# Patient Record
Sex: FEMALE | Race: BLACK OR AFRICAN AMERICAN | NOT HISPANIC OR LATINO | Employment: FULL TIME | URBAN - METROPOLITAN AREA
[De-identification: names, ages, dates, MRNs, and addresses within clinical notes are randomized per-mention and may not be internally consistent; named-entity substitution may affect disease eponyms.]

---

## 2017-07-25 PROCEDURE — 81025 URINE PREGNANCY TEST: CPT | Performed by: EMERGENCY MEDICINE

## 2017-07-25 PROCEDURE — 96361 HYDRATE IV INFUSION ADD-ON: CPT

## 2017-07-25 PROCEDURE — 81000 URINALYSIS NONAUTO W/SCOPE: CPT

## 2017-07-25 PROCEDURE — 96375 TX/PRO/DX INJ NEW DRUG ADDON: CPT

## 2017-07-25 PROCEDURE — 96365 THER/PROPH/DIAG IV INF INIT: CPT

## 2017-07-25 PROCEDURE — 99284 EMERGENCY DEPT VISIT MOD MDM: CPT | Mod: 25

## 2017-07-25 PROCEDURE — 87591 N.GONORRHOEAE DNA AMP PROB: CPT

## 2017-07-25 PROCEDURE — 96372 THER/PROPH/DIAG INJ SC/IM: CPT

## 2017-07-26 ENCOUNTER — HOSPITAL ENCOUNTER (EMERGENCY)
Facility: HOSPITAL | Age: 42
Discharge: HOME | End: 2017-07-26
Attending: EMERGENCY MEDICINE
Payer: MEDICAID

## 2017-07-26 VITALS
RESPIRATION RATE: 18 BRPM | HEIGHT: 59 IN | WEIGHT: 143 LBS | SYSTOLIC BLOOD PRESSURE: 150 MMHG | HEART RATE: 70 BPM | BODY MASS INDEX: 28.83 KG/M2 | TEMPERATURE: 99 F | OXYGEN SATURATION: 100 % | DIASTOLIC BLOOD PRESSURE: 78 MMHG

## 2017-07-26 DIAGNOSIS — N13.30 HYDRONEPHROSIS, RIGHT: Primary | ICD-10-CM

## 2017-07-26 DIAGNOSIS — N20.0 NEPHROLITHIASIS: ICD-10-CM

## 2017-07-26 LAB
ALBUMIN SERPL BCP-MCNC: 3.5 G/DL
ALP SERPL-CCNC: 77 U/L
ALT SERPL W/O P-5'-P-CCNC: 9 U/L
ANION GAP SERPL CALC-SCNC: 9 MMOL/L
AST SERPL-CCNC: 21 U/L
B-HCG UR QL: NEGATIVE
BACTERIA #/AREA URNS HPF: ABNORMAL /HPF
BASOPHILS # BLD AUTO: 0.01 K/UL
BASOPHILS NFR BLD: 0.1 %
BILIRUB SERPL-MCNC: 0.6 MG/DL
BILIRUB UR QL STRIP: NEGATIVE
BUN SERPL-MCNC: 15 MG/DL
C TRACH DNA SPEC QL NAA+PROBE: NOT DETECTED
CALCIUM SERPL-MCNC: 9.9 MG/DL
CHLORIDE SERPL-SCNC: 105 MMOL/L
CLARITY UR: CLEAR
CO2 SERPL-SCNC: 24 MMOL/L
COLOR UR: YELLOW
CREAT SERPL-MCNC: 1.2 MG/DL
CTP QC/QA: YES
DIFFERENTIAL METHOD: ABNORMAL
EOSINOPHIL # BLD AUTO: 0 K/UL
EOSINOPHIL NFR BLD: 0.1 %
ERYTHROCYTE [DISTWIDTH] IN BLOOD BY AUTOMATED COUNT: 14.5 %
EST. GFR  (AFRICAN AMERICAN): >60 ML/MIN/1.73 M^2
EST. GFR  (NON AFRICAN AMERICAN): 56 ML/MIN/1.73 M^2
GLUCOSE SERPL-MCNC: 157 MG/DL
GLUCOSE UR QL STRIP: NEGATIVE
HCT VFR BLD AUTO: 34.9 %
HGB BLD-MCNC: 11.4 G/DL
HGB UR QL STRIP: NEGATIVE
KETONES UR QL STRIP: ABNORMAL
LEUKOCYTE ESTERASE UR QL STRIP: NEGATIVE
LYMPHOCYTES # BLD AUTO: 2.2 K/UL
LYMPHOCYTES NFR BLD: 18 %
MCH RBC QN AUTO: 29.5 PG
MCHC RBC AUTO-ENTMCNC: 32.7 G/DL
MCV RBC AUTO: 90 FL
MICROSCOPIC COMMENT: ABNORMAL
MONOCYTES # BLD AUTO: 0.9 K/UL
MONOCYTES NFR BLD: 7.5 %
N GONORRHOEA DNA SPEC QL NAA+PROBE: NOT DETECTED
NEUTROPHILS # BLD AUTO: 8.9 K/UL
NEUTROPHILS NFR BLD: 74.2 %
NITRITE UR QL STRIP: NEGATIVE
PH UR STRIP: 6 [PH] (ref 5–8)
PLATELET # BLD AUTO: 380 K/UL
PMV BLD AUTO: 9.6 FL
POTASSIUM SERPL-SCNC: 4 MMOL/L
PROT SERPL-MCNC: 8.6 G/DL
PROT UR QL STRIP: NEGATIVE
RBC # BLD AUTO: 3.87 M/UL
RBC #/AREA URNS HPF: 1 /HPF (ref 0–4)
SODIUM SERPL-SCNC: 138 MMOL/L
SP GR UR STRIP: 1.02 (ref 1–1.03)
SQUAMOUS #/AREA URNS HPF: ABNORMAL /HPF
TRICHOMONAS UR QL MICRO: ABNORMAL
URN SPEC COLLECT METH UR: ABNORMAL
UROBILINOGEN UR STRIP-ACNC: NEGATIVE EU/DL
WBC # BLD AUTO: 12.03 K/UL
WBC #/AREA URNS HPF: 4 /HPF (ref 0–5)

## 2017-07-26 PROCEDURE — 80053 COMPREHEN METABOLIC PANEL: CPT

## 2017-07-26 PROCEDURE — 85025 COMPLETE CBC W/AUTO DIFF WBC: CPT

## 2017-07-26 PROCEDURE — 25500020 PHARM REV CODE 255: Performed by: EMERGENCY MEDICINE

## 2017-07-26 PROCEDURE — 25000003 PHARM REV CODE 250: Performed by: NURSE PRACTITIONER

## 2017-07-26 PROCEDURE — 63600175 PHARM REV CODE 636 W HCPCS: Performed by: NURSE PRACTITIONER

## 2017-07-26 RX ORDER — KETOROLAC TROMETHAMINE 30 MG/ML
30 INJECTION, SOLUTION INTRAMUSCULAR; INTRAVENOUS
Status: COMPLETED | OUTPATIENT
Start: 2017-07-26 | End: 2017-07-26

## 2017-07-26 RX ORDER — AZITHROMYCIN 250 MG/1
1000 TABLET, FILM COATED ORAL
Status: COMPLETED | OUTPATIENT
Start: 2017-07-26 | End: 2017-07-26

## 2017-07-26 RX ORDER — CEFTRIAXONE 1 G/1
250 INJECTION, POWDER, FOR SOLUTION INTRAMUSCULAR; INTRAVENOUS
Status: COMPLETED | OUTPATIENT
Start: 2017-07-26 | End: 2017-07-26

## 2017-07-26 RX ORDER — NAPROXEN 500 MG/1
500 TABLET ORAL 2 TIMES DAILY PRN
Qty: 30 TABLET | Refills: 0 | OUTPATIENT
Start: 2017-07-26 | End: 2018-08-21

## 2017-07-26 RX ORDER — METRONIDAZOLE 500 MG/1
2 TABLET ORAL
Status: COMPLETED | OUTPATIENT
Start: 2017-07-26 | End: 2017-07-26

## 2017-07-26 RX ORDER — ONDANSETRON 2 MG/ML
8 INJECTION INTRAMUSCULAR; INTRAVENOUS
Status: COMPLETED | OUTPATIENT
Start: 2017-07-26 | End: 2017-07-26

## 2017-07-26 RX ADMIN — IOHEXOL 70 ML: 350 INJECTION, SOLUTION INTRAVENOUS at 03:07

## 2017-07-26 RX ADMIN — KETOROLAC TROMETHAMINE 30 MG: 30 INJECTION, SOLUTION INTRAMUSCULAR at 05:07

## 2017-07-26 RX ADMIN — METRONIDAZOLE 2 G: 500 TABLET ORAL at 05:07

## 2017-07-26 RX ADMIN — PROMETHAZINE HYDROCHLORIDE 12.5 MG: 25 INJECTION INTRAMUSCULAR; INTRAVENOUS at 03:07

## 2017-07-26 RX ADMIN — CEFTRIAXONE 250 MG: 1 INJECTION, POWDER, FOR SOLUTION INTRAMUSCULAR; INTRAVENOUS at 05:07

## 2017-07-26 RX ADMIN — ONDANSETRON 8 MG: 2 INJECTION INTRAMUSCULAR; INTRAVENOUS at 02:07

## 2017-07-26 RX ADMIN — AZITHROMYCIN 1000 MG: 250 TABLET, FILM COATED ORAL at 05:07

## 2017-07-26 RX ADMIN — SODIUM CHLORIDE 1000 ML: 0.9 INJECTION, SOLUTION INTRAVENOUS at 02:07

## 2017-07-26 NOTE — ED TRIAGE NOTES
Pt c/o vomiting and abdominal pain.on right side.  She states that she is having back pain on the right side.   She states that she has been having chills for a fever.    She states that she has urinating often and feels to go back to urinate again.  Pt states that she has taken ibuprofen and Pamprin to help with the pain with no relief

## 2017-07-26 NOTE — ED PROVIDER NOTES
"Encounter Date: 2017    SCRIBE #1 NOTE: I, Jaky Vicente, am scribing for, and in the presence of,  Rohan Michaud NP. I have scribed the following portions of the note - Other sections scribed: HPI, ROS.       History     Chief Complaint   Patient presents with    Abdominal Pain     "I've been throwing up since 3, and my back and my stomach have pains."     CC: Flank Pain; Abdominal Pain    HPI: 41 year old female presents to the ED c/o acute onset, severe (9/10) R flank pain that radiates down to the RLQ with associated nausea, NBNB vomiting, urinary frequency, and subjective fever since 3:00 pm yesterday. Pt otherwise denies diarrhea and dysuria. Prior attempted treatment with ibuprofen, but pt was unable to keep the medication down. No alleviating factors.      The history is provided by the patient. No  was used.     Review of patient's allergies indicates:  No Known Allergies  History reviewed. No pertinent past medical history.  Past Surgical History:   Procedure Laterality Date     SECTION       History reviewed. No pertinent family history.  Social History   Substance Use Topics    Smoking status: Never Smoker    Smokeless tobacco: Never Used    Alcohol use No     Review of Systems   Constitutional: Positive for fever (subjective). Negative for chills and diaphoresis.   HENT: Negative for ear pain and sore throat.    Eyes: Negative for photophobia and visual disturbance.   Respiratory: Negative for cough and shortness of breath.    Cardiovascular: Negative for chest pain.   Gastrointestinal: Positive for abdominal pain (RLQ), nausea and vomiting. Negative for diarrhea.   Genitourinary: Positive for flank pain (R) and frequency. Negative for dysuria.   Musculoskeletal: Negative for back pain.   Skin: Negative for rash.   Neurological: Negative for headaches.       Physical Exam     Initial Vitals [17 2332]   BP Pulse Resp Temp SpO2   (!) 175/87 87 18 98.6 °F (37 °C) 99 " %      MAP       116.33         Physical Exam    Nursing note and vitals reviewed.  Constitutional: She appears well-developed and well-nourished. She is not diaphoretic. No distress.   HENT:   Head: Normocephalic and atraumatic.   Right Ear: External ear normal.   Left Ear: External ear normal.   Nose: Nose normal.   Eyes: Conjunctivae and EOM are normal. Pupils are equal, round, and reactive to light. Right eye exhibits no discharge. Left eye exhibits no discharge. No scleral icterus.   Neck: Normal range of motion. Neck supple. No thyromegaly present. No tracheal deviation present. No JVD present.   Cardiovascular: Normal rate, regular rhythm, normal heart sounds and intact distal pulses. Exam reveals no gallop and no friction rub.    No murmur heard.  Pulmonary/Chest: Breath sounds normal. No stridor. No respiratory distress. She has no wheezes. She has no rhonchi. She has no rales.   Abdominal: Soft. Bowel sounds are normal. She exhibits no distension and no mass. There is no hepatosplenomegaly. There is tenderness in the right lower quadrant. There is CVA tenderness (right-sided). There is no rebound, no guarding, no tenderness at McBurney's point and negative Vital's sign. No hernia.   Musculoskeletal: Normal range of motion. She exhibits no edema or tenderness.   Lymphadenopathy:     She has no cervical adenopathy.   Neurological: She is alert and oriented to person, place, and time. She has normal strength and normal reflexes. She displays normal reflexes. No cranial nerve deficit or sensory deficit.   Skin: Skin is warm and dry. No rash and no abscess noted. No erythema. No pallor.   Psychiatric: She has a normal mood and affect. Her behavior is normal. Judgment and thought content normal.         ED Course   Procedures  Labs Reviewed   URINALYSIS - Abnormal; Notable for the following:        Result Value    Ketones, UA Trace (*)     All other components within normal limits   CBC W/ AUTO DIFFERENTIAL -  Abnormal; Notable for the following:     RBC 3.87 (*)     Hemoglobin 11.4 (*)     Hematocrit 34.9 (*)     Platelets 380 (*)     Gran # 8.9 (*)     Gran% 74.2 (*)     All other components within normal limits   COMPREHENSIVE METABOLIC PANEL - Abnormal; Notable for the following:     Glucose 157 (*)     Total Protein 8.6 (*)     ALT 9 (*)     eGFR if non  56 (*)     All other components within normal limits   URINALYSIS MICROSCOPIC - Abnormal; Notable for the following:     Trichomonas, UA Few (*)     All other components within normal limits   C. TRACHOMATIS/N. GONORRHOEAE BY AMP DNA   POCT URINE PREGNANCY             Medical Decision Making:   Clinical Tests:   Lab Tests: Ordered and Reviewed  Radiological Study: Ordered and Reviewed  ED Management:  This is a 41-year-old female presenting to the emergency department for evaluation of right lumbar back, right flank, and right lower quadrant abdominal pain with associated nausea and vomiting.Symptoms began yesterday and afternoon a been continuous. Patient is afebrile and appears uncomfortable. She denies diarrhea, dysuria, chest pain, shortness of breath, vaginal bleeding, vaginal discharge. Right CVA tenderness to palpation. Patient states she was recently told by a clinic that she was positive for chlamydia but has not been treated. I will treat for chlamydia. CT of remarkable for right hydroureteronephrosis without evidence of obstructive stone.Finding likely represents difficult to visualize obstructing calculus versus obstructing calculus that has passed. I considered but doubt pyelonephritis, appendicitis, diverticulitis, cholecystitis, among others.    Ordered Rocephin, Flagyl, azithromycin to treat for chlamydia. Prescribed naproxen at discharge. Instructed patient to follow-up with PCP and/or urology. Patient is safe and stable for discharge and outpatient follow-up. Pt discharged home with instructions for follow up and supportive care. ED  return precautions given. Pt verbalized understanding of all information and instructions given.   Other:   I have discussed this case with another health care provider.       <> Summary of the Discussion: Case discussed my attending physician Christiana Fatima M.D. who agreed with the assessment and plan            Scribe Attestation:   Scribe #1: I performed the above scribed service and the documentation accurately describes the services I performed. I attest to the accuracy of the note.    Attending Attestation:           Physician Attestation for Scribe:  Physician Attestation Statement for Scribe #1: I, Rohan Michaud NP, reviewed documentation, as scribed by Jaky Vicente in my presence, and it is both accurate and complete.                 ED Course   Value Comment By Time   Appearance, UA: Clear (Reviewed) Rohan Michaud NP 07/26 4399     Clinical Impression:   The primary encounter diagnosis was Hydronephrosis, right. A diagnosis of Nephrolithiasis was also pertinent to this visit.    Disposition:   Disposition: Discharged  Condition: Stable                        Rohan Michaud NP  07/26/17 5885

## 2017-07-26 NOTE — DISCHARGE INSTRUCTIONS
Follow-up with your primary care physician in the next several days.    Pain medication as needed and only as prescribed.    Return to the emergency department for any new or worsening symptoms.

## 2018-08-06 DIAGNOSIS — Z12.39 SCREENING BREAST EXAMINATION: Primary | ICD-10-CM

## 2018-08-13 ENCOUNTER — HOSPITAL ENCOUNTER (OUTPATIENT)
Dept: RADIOLOGY | Facility: HOSPITAL | Age: 43
Discharge: HOME | End: 2018-08-13
Attending: INTERNAL MEDICINE
Payer: MEDICAID

## 2018-08-13 VITALS — BODY MASS INDEX: 28.83 KG/M2 | WEIGHT: 143 LBS | HEIGHT: 59 IN

## 2018-08-13 DIAGNOSIS — Z12.39 SCREENING BREAST EXAMINATION: ICD-10-CM

## 2018-08-13 PROCEDURE — 77067 SCR MAMMO BI INCL CAD: CPT | Mod: 26,,, | Performed by: RADIOLOGY

## 2018-08-13 PROCEDURE — 77063 BREAST TOMOSYNTHESIS BI: CPT | Mod: TC

## 2018-08-13 PROCEDURE — 77063 BREAST TOMOSYNTHESIS BI: CPT | Mod: 26,,, | Performed by: RADIOLOGY

## 2018-08-21 ENCOUNTER — HOSPITAL ENCOUNTER (EMERGENCY)
Facility: HOSPITAL | Age: 43
Discharge: HOME | End: 2018-08-21
Attending: EMERGENCY MEDICINE
Payer: MEDICAID

## 2018-08-21 VITALS
WEIGHT: 135 LBS | SYSTOLIC BLOOD PRESSURE: 142 MMHG | OXYGEN SATURATION: 98 % | BODY MASS INDEX: 27.21 KG/M2 | HEART RATE: 96 BPM | RESPIRATION RATE: 18 BRPM | DIASTOLIC BLOOD PRESSURE: 100 MMHG | HEIGHT: 59 IN | TEMPERATURE: 100 F

## 2018-08-21 DIAGNOSIS — S16.1XXA CERVICAL STRAIN, ACUTE, INITIAL ENCOUNTER: Primary | ICD-10-CM
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
## 2018-08-21 DIAGNOSIS — R51.9 ACUTE NONINTRACTABLE HEADACHE, UNSPECIFIED HEADACHE TYPE: ICD-10-CM

## 2018-08-21 DIAGNOSIS — R03.0 ELEVATED BLOOD PRESSURE READING WITHOUT DIAGNOSIS OF HYPERTENSION: ICD-10-CM

## 2018-08-21 PROCEDURE — 25000003 PHARM REV CODE 250: Performed by: EMERGENCY MEDICINE

## 2018-08-21 PROCEDURE — 99283 EMERGENCY DEPT VISIT LOW MDM: CPT

## 2018-08-21 RX ORDER — CYCLOBENZAPRINE HCL 10 MG
10 TABLET ORAL 3 TIMES DAILY PRN
Qty: 15 TABLET | Refills: 0 | Status: SHIPPED | OUTPATIENT
Start: 2018-08-21 | End: 2018-08-26

## 2018-08-21 RX ORDER — IBUPROFEN 600 MG/1
600 TABLET ORAL EVERY 6 HOURS PRN
Qty: 20 TABLET | Refills: 0 | Status: SHIPPED | OUTPATIENT
Start: 2018-08-21 | End: 2023-04-30 | Stop reason: HOSPADM

## 2018-08-21 RX ORDER — IBUPROFEN 600 MG/1
600 TABLET ORAL
Status: COMPLETED | OUTPATIENT
Start: 2018-08-21 | End: 2018-08-21

## 2018-08-21 RX ORDER — CYCLOBENZAPRINE HCL 10 MG
10 TABLET ORAL
Status: COMPLETED | OUTPATIENT
Start: 2018-08-21 | End: 2018-08-21

## 2018-08-21 RX ADMIN — IBUPROFEN 600 MG: 600 TABLET ORAL at 04:08

## 2018-08-21 RX ADMIN — CYCLOBENZAPRINE HYDROCHLORIDE 10 MG: 10 TABLET, FILM COATED ORAL at 04:08
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
## 2022-11-29 DIAGNOSIS — Z12.31 ENCOUNTER FOR SCREENING MAMMOGRAM FOR MALIGNANT NEOPLASM OF BREAST: Primary | ICD-10-CM

## 2023-01-10 ENCOUNTER — HOSPITAL ENCOUNTER (OUTPATIENT)
Dept: RADIOLOGY | Facility: HOSPITAL | Age: 48
Discharge: HOME | End: 2023-01-10
Attending: INTERNAL MEDICINE
Payer: MEDICAID

## 2023-01-10 VITALS — HEIGHT: 59 IN | WEIGHT: 135 LBS | BODY MASS INDEX: 27.21 KG/M2

## 2023-01-10 DIAGNOSIS — Z12.31 ENCOUNTER FOR SCREENING MAMMOGRAM FOR MALIGNANT NEOPLASM OF BREAST: ICD-10-CM

## 2023-01-10 PROCEDURE — 77067 SCR MAMMO BI INCL CAD: CPT | Mod: 26,,, | Performed by: RADIOLOGY

## 2023-01-10 PROCEDURE — 77067 SCR MAMMO BI INCL CAD: CPT | Mod: TC

## 2023-01-10 PROCEDURE — 77063 MAMMO DIGITAL SCREENING BILAT WITH TOMO: ICD-10-PCS | Mod: 26,,, | Performed by: RADIOLOGY

## 2023-01-10 PROCEDURE — 77063 BREAST TOMOSYNTHESIS BI: CPT | Mod: 26,,, | Performed by: RADIOLOGY

## 2023-01-10 PROCEDURE — 77067 MAMMO DIGITAL SCREENING BILAT WITH TOMO: ICD-10-PCS | Mod: 26,,, | Performed by: RADIOLOGY

## 2023-04-30 ENCOUNTER — HOSPITAL ENCOUNTER (EMERGENCY)
Facility: HOSPITAL | Age: 48
Discharge: HOME | End: 2023-05-01
Attending: EMERGENCY MEDICINE
Payer: MEDICAID

## 2023-04-30 DIAGNOSIS — R03.0 ELEVATED BLOOD PRESSURE READING: ICD-10-CM

## 2023-04-30 DIAGNOSIS — R51.9 ACUTE NONINTRACTABLE HEADACHE, UNSPECIFIED HEADACHE TYPE: Primary | ICD-10-CM

## 2023-04-30 LAB
ANION GAP SERPL CALC-SCNC: 19 MMOL/L (ref 8–16)
B-HCG UR QL: NEGATIVE
BILIRUB UR QL STRIP: NEGATIVE
BUN SERPL-MCNC: 13 MG/DL (ref 6–30)
CHLORIDE SERPL-SCNC: 103 MMOL/L (ref 95–110)
CLARITY UR: CLEAR
COLOR UR: COLORLESS
CREAT SERPL-MCNC: 0.5 MG/DL (ref 0.5–1.4)
CTP QC/QA: YES
GLUCOSE SERPL-MCNC: 201 MG/DL (ref 70–110)
GLUCOSE UR QL STRIP: ABNORMAL
HCT VFR BLD CALC: 35 %PCV (ref 36–54)
HGB UR QL STRIP: NEGATIVE
KETONES UR QL STRIP: NEGATIVE
LEUKOCYTE ESTERASE UR QL STRIP: NEGATIVE
NITRITE UR QL STRIP: NEGATIVE
PH UR STRIP: 6 [PH] (ref 5–8)
POC IONIZED CALCIUM: 1.24 MMOL/L (ref 1.06–1.42)
POC TCO2 (MEASURED): 20 MMOL/L (ref 23–29)
POCT GLUCOSE: 212 MG/DL (ref 70–110)
POTASSIUM BLD-SCNC: 4.4 MMOL/L (ref 3.5–5.1)
PROT UR QL STRIP: NEGATIVE
SAMPLE: ABNORMAL
SODIUM BLD-SCNC: 137 MMOL/L (ref 136–145)
SP GR UR STRIP: 1 (ref 1–1.03)
URN SPEC COLLECT METH UR: ABNORMAL
UROBILINOGEN UR STRIP-ACNC: NEGATIVE EU/DL

## 2023-04-30 PROCEDURE — 81003 URINALYSIS AUTO W/O SCOPE: CPT | Performed by: PHYSICIAN ASSISTANT

## 2023-04-30 PROCEDURE — 82962 GLUCOSE BLOOD TEST: CPT

## 2023-04-30 PROCEDURE — 84132 ASSAY OF SERUM POTASSIUM: CPT

## 2023-04-30 PROCEDURE — 99284 EMERGENCY DEPT VISIT MOD MDM: CPT | Mod: 25

## 2023-04-30 PROCEDURE — 81025 URINE PREGNANCY TEST: CPT | Performed by: PHYSICIAN ASSISTANT

## 2023-04-30 PROCEDURE — 82565 ASSAY OF CREATININE: CPT

## 2023-04-30 PROCEDURE — 99900035 HC TECH TIME PER 15 MIN (STAT)

## 2023-04-30 PROCEDURE — 82330 ASSAY OF CALCIUM: CPT

## 2023-04-30 PROCEDURE — 85014 HEMATOCRIT: CPT

## 2023-04-30 PROCEDURE — 80047 BASIC METABLC PNL IONIZED CA: CPT

## 2023-04-30 PROCEDURE — 25000003 PHARM REV CODE 250: Performed by: PHYSICIAN ASSISTANT

## 2023-04-30 PROCEDURE — 84295 ASSAY OF SERUM SODIUM: CPT | Mod: 91

## 2023-04-30 RX ORDER — BUTALBITAL, ACETAMINOPHEN AND CAFFEINE 50; 325; 40 MG/1; MG/1; MG/1
1 TABLET ORAL EVERY 4 HOURS PRN
Qty: 15 TABLET | Refills: 0 | Status: SHIPPED | OUTPATIENT
Start: 2023-04-30 | End: 2023-05-03

## 2023-04-30 RX ORDER — CLONIDINE HYDROCHLORIDE 0.1 MG/1
0.2 TABLET ORAL
Status: COMPLETED | OUTPATIENT
Start: 2023-04-30 | End: 2023-04-30

## 2023-04-30 RX ORDER — IBUPROFEN 600 MG/1
600 TABLET ORAL
Status: COMPLETED | OUTPATIENT
Start: 2023-05-01 | End: 2023-05-01

## 2023-04-30 RX ORDER — AMLODIPINE BESYLATE 5 MG/1
5 TABLET ORAL DAILY
Qty: 30 TABLET | Refills: 0 | Status: SHIPPED | OUTPATIENT
Start: 2023-04-30 | End: 2023-05-30

## 2023-04-30 RX ORDER — IBUPROFEN 600 MG/1
600 TABLET ORAL EVERY 6 HOURS PRN
Qty: 20 TABLET | Refills: 0 | Status: SHIPPED | OUTPATIENT
Start: 2023-04-30 | End: 2023-05-05

## 2023-04-30 RX ORDER — BUTALBITAL, ACETAMINOPHEN AND CAFFEINE 50; 325; 40 MG/1; MG/1; MG/1
1 TABLET ORAL
Status: COMPLETED | OUTPATIENT
Start: 2023-05-01 | End: 2023-05-01

## 2023-04-30 RX ADMIN — CLONIDINE HYDROCHLORIDE 0.2 MG: 0.1 TABLET ORAL at 11:04

## 2023-04-30 NOTE — Clinical Note
"Amber"Servando Bryson was seen and treated in our emergency department on 4/30/2023.  She may return to work on 05/03/2023.       If you have any questions or concerns, please don't hesitate to call.      Jovanna Pierce PA-C"

## 2023-05-01 VITALS
WEIGHT: 135 LBS | TEMPERATURE: 98 F | RESPIRATION RATE: 18 BRPM | SYSTOLIC BLOOD PRESSURE: 168 MMHG | DIASTOLIC BLOOD PRESSURE: 97 MMHG | HEIGHT: 59 IN | OXYGEN SATURATION: 100 % | HEART RATE: 78 BPM | BODY MASS INDEX: 27.21 KG/M2

## 2023-05-01 PROCEDURE — 25000003 PHARM REV CODE 250: Performed by: PHYSICIAN ASSISTANT

## 2023-05-01 RX ADMIN — IBUPROFEN 600 MG: 600 TABLET ORAL at 12:05

## 2023-05-01 RX ADMIN — BUTALBITAL, ACETAMINOPHEN, AND CAFFEINE 1 TABLET: 325; 50; 40 TABLET ORAL at 12:05

## 2023-05-01 NOTE — DISCHARGE INSTRUCTIONS
Your BP was 180/112 in the ER and decreased to 170/97 after clonidine  Please call your doctor tomorrow and see if they would like you to start taking amlodipine BP medication. Take ibuprofen and fioricet for headaches. Follow up for your eye exam as instructed. Return to ER for worsening or as needed      Thank you for coming to our Emergency Department today. It is important to remember that some problems are difficult to diagnose and may not be found during your Emergency Department visit. Be sure to follow up with your primary care doctor and review all labs/imaging/tests that were performed during this visit with them. Some labs/tests may be outside of the normal range and require non-emergent follow-up and further investigation to help diagnose/exclude/prevent complications or other medical conditions.    If you do not have a primary care doctor, you may contact the one listed on your discharge paperwork or you may also call the [facility] Appointment Desk at [phone] to schedule an appointment and establish care with one. It is important to your health that you have a primary care doctor.    Please take all medications as directed. All medications may potentially have side-effects and it is impossible to predict which medications may give you side-effects or what side-effects (if any) they will give you.. If you feel that you are having a negative effect or side-effect of any medication you should immediately stop taking them and seek medical attention. If you feel that you are having a life-threatening reaction call 911.    Return to the ER with any questions/concerns, new/concerning symptoms, worsening or failure to improve.     Do not drive, swim, climb to height, take a bath or make any important decisions for 24 hours if you have received any pain medications, sedatives or mood altering drugs during your ER visit.

## 2023-05-01 NOTE — ED PROVIDER NOTES
Encounter Date: 2023       History     Chief Complaint   Patient presents with    Headache     HA x 2 hours. Denies blurry vision, nausea or vomiting. Denies neck pain. Pt states pain is posterior and throbbing. Denies hx of HTN     Chief complaint:  Headache    HPI:    47-year-old female with history of diabetes presenting for 2 hour history of generalized headache.  She reports it is throbbing in nature.  She denies history of similar headaches.  Denies any fever, chills, nausea vomiting, neck pain, back pain, weakness or paresthesias.  She is unsure of her last eye exam but reports she was informed by her primary care doctor that she needs to get her eyes checked.  Does not wear contacts or glasses. Denies confusion, gait or speech difficulty, abdominal pain, nausea vomiting, decreased p.o. intake, decreased sleep, increased stressors    No history of hypertension.  Just saw her primary care doctor last week and reports her blood pressure was not elevated at that time.  She states she is concerned her blood pressure may be elevated due to eating salt today    No attempted treatment    The history is provided by the patient.   Review of patient's allergies indicates:  No Known Allergies  No past medical history on file.  Past Surgical History:   Procedure Laterality Date     SECTION       No family history on file.  Social History     Tobacco Use    Smoking status: Never    Smokeless tobacco: Never   Substance Use Topics    Alcohol use: No     Review of Systems   Constitutional:  Negative for chills and fever.   HENT:  Negative for congestion, ear pain, nosebleeds, rhinorrhea, sore throat and trouble swallowing.    Eyes:  Negative for redness.   Respiratory:  Negative for cough, shortness of breath and stridor.    Cardiovascular:  Negative for chest pain.   Gastrointestinal:  Negative for abdominal pain, constipation, diarrhea, nausea and vomiting.   Genitourinary:  Negative for decreased urine  volume, dysuria, frequency, hematuria and urgency.   Musculoskeletal:  Negative for back pain and neck pain.   Skin:  Negative for rash and wound.   Neurological:  Positive for headaches. Negative for dizziness, speech difficulty, weakness, light-headedness and numbness.   Hematological:  Does not bruise/bleed easily.   Psychiatric/Behavioral:  Negative for confusion.      Physical Exam     Initial Vitals [04/30/23 2202]   BP Pulse Resp Temp SpO2   (!) 180/112 98 20 98.6 °F (37 °C) 100 %      MAP       --         Physical Exam    Nursing note and vitals reviewed.  Constitutional: She appears well-developed and well-nourished.   HENT:   Head: Normocephalic.   Right Ear: External ear normal.   Left Ear: External ear normal.   Nose: Nose normal.   Mouth/Throat: Oropharynx is clear and moist.   Eyes: Conjunctivae are normal.   Cardiovascular:  Normal rate and regular rhythm.     Exam reveals no gallop and no friction rub.       No murmur heard.  Pulmonary/Chest: Breath sounds normal. She has no wheezes. She has no rhonchi. She has no rales.   Abdominal: Abdomen is soft. Bowel sounds are normal. She exhibits no distension. There is no abdominal tenderness. There is no rebound, no guarding, no tenderness at McBurney's point and negative Vital's sign.   Musculoskeletal:         General: Normal range of motion.     Lymphadenopathy:     She has no cervical adenopathy.   Neurological: She is alert. She has normal strength. No cranial nerve deficit or sensory deficit. Coordination and gait normal.   Skin: Skin is warm and dry.   Psychiatric: She has a normal mood and affect.       ED Course   Procedures  Labs Reviewed   URINALYSIS, REFLEX TO URINE CULTURE - Abnormal; Notable for the following components:       Result Value    Color, UA Colorless (*)     Glucose, UA 2+ (*)     All other components within normal limits    Narrative:     Specimen Source->Urine   POCT GLUCOSE - Abnormal; Notable for the following components:     POCT Glucose 212 (*)     All other components within normal limits   ISTAT PROCEDURE - Abnormal; Notable for the following components:    POC Glucose 201 (*)     POC TCO2 (MEASURED) 20 (*)     POC Anion Gap 19 (*)     POC Hematocrit 35 (*)     All other components within normal limits   POCT URINE PREGNANCY - Normal          Imaging Results              CT Head Without Contrast (Final result)  Result time 04/30/23 23:18:57      Final result by Sapna Antonio MD (04/30/23 23:18:57)                   Impression:      No acute intracranial abnormality detected.      Electronically signed by: Sapna Antonio  Date:    04/30/2023  Time:    23:18               Narrative:    EXAMINATION:  CT OF THE HEAD WITHOUT    CLINICAL HISTORY:  Headache, sudden, severe;    TECHNIQUE:  5 mm unenhanced axial images were obtained from the skull base to the vertex.    COMPARISON:  None.    FINDINGS:  The ventricles, basal cisterns, and cortical sulci are within normal limits for patient's stated age. There is no acute intracranial hemorrhage, territorial infarct or mass effect, or midline shift. The visualized paranasal sinuses and mastoid air cells are clear.                                       Medications   cloNIDine tablet 0.2 mg (0.2 mg Oral Given 4/30/23 2331)   butalbital-acetaminophen-caffeine -40 mg per tablet 1 tablet (1 tablet Oral Given 5/1/23 0029)   ibuprofen tablet 600 mg (600 mg Oral Given 5/1/23 0029)     Medical Decision Making:   Clinical Tests:   Lab Tests: Ordered and Reviewed  Radiological Study: Ordered and Reviewed  ED Management:  47-year-old female presenting for headache.  Blood pressure elevated in triage.  No history of hypertension.  Clonidine given with improvement of blood pressure in the ED.  Patient is still having headache.  Ibuprofen and Fioricet given in the ED.  No focal neurologic deficits.  No meningeal signs.  No sinus tenderness.  UA negative for UTI.  UPT is negative.  Her glucose is  elevated another 200s patient has a history of diabetes.  Is compliant with her medications.  Considered but low suspicion for DKA.  I-STAT ordered and renal function is normal.  Considered but doubt hypertensive emergency at this time.  Will discharge patient with medications for symptomatic treatment.  Follow up with primary care in 2 days return ER for worsening or as needed.                        Clinical Impression:   Final diagnoses:  [R51.9] Acute nonintractable headache, unspecified headache type (Primary)  [R03.0] Elevated blood pressure reading        ED Disposition Condition    Discharge Stable          ED Prescriptions       Medication Sig Dispense Start Date End Date Auth. Provider    butalbital-acetaminophen-caffeine -40 mg (FIORICET, ESGIC) -40 mg per tablet Take 1 tablet by mouth every 4 (four) hours as needed for Pain or Headaches. MAY CAUSE DROWSINESS 15 tablet 4/30/2023 5/3/2023 Jovanna Pierce PA-C    ibuprofen (ADVIL,MOTRIN) 600 MG tablet Take 1 tablet (600 mg total) by mouth every 6 (six) hours as needed for Pain. 20 tablet 4/30/2023 5/5/2023 Jovanna Pierce PA-C    amLODIPine (NORVASC) 5 MG tablet Take 1 tablet (5 mg total) by mouth once daily. 30 tablet 4/30/2023 5/30/2023 Jovanna Pierce PA-C          Follow-up Information       Follow up With Specialties Details Why Contact Info    Nancy Chacon MD Pediatrics Schedule an appointment as soon as possible for a visit in 2 days for follow up [address]  [facility] [address]  [phone]      [facility] - Emergency Dept Emergency Medicine Go to  As needed, If symptoms worsen [address] [place] [address] [phone]             Jovanna Pierce PA-C  05/01/23 0621

## 2023-05-01 NOTE — ED TRIAGE NOTES
HA x 2 hours. Denies blurry vision, nausea or vomiting. Denies neck pain. Pt states pain is posterior and throbbing. Denies hx of HTN

## 2023-10-24 ENCOUNTER — HOSPITAL ENCOUNTER (EMERGENCY)
Facility: HOSPITAL | Age: 48
Discharge: HOME | End: 2023-10-24
Attending: EMERGENCY MEDICINE
Payer: MEDICAID

## 2023-10-24 VITALS
DIASTOLIC BLOOD PRESSURE: 93 MMHG | WEIGHT: 138 LBS | OXYGEN SATURATION: 97 % | BODY MASS INDEX: 27.82 KG/M2 | HEART RATE: 97 BPM | SYSTOLIC BLOOD PRESSURE: 148 MMHG | RESPIRATION RATE: 18 BRPM | TEMPERATURE: 99 F | HEIGHT: 59 IN

## 2023-10-24 DIAGNOSIS — M25.552 PAIN OF LEFT HIP: Primary | ICD-10-CM

## 2023-10-24 LAB
B-HCG UR QL: NEGATIVE
CTP QC/QA: YES
POCT GLUCOSE: 196 MG/DL (ref 70–110)

## 2023-10-24 PROCEDURE — 63600175 PHARM REV CODE 636 W HCPCS

## 2023-10-24 PROCEDURE — 99284 EMERGENCY DEPT VISIT MOD MDM: CPT

## 2023-10-24 PROCEDURE — 82962 GLUCOSE BLOOD TEST: CPT

## 2023-10-24 PROCEDURE — 81025 URINE PREGNANCY TEST: CPT | Performed by: NURSE PRACTITIONER

## 2023-10-24 PROCEDURE — 96372 THER/PROPH/DIAG INJ SC/IM: CPT

## 2023-10-24 RX ORDER — MELOXICAM 7.5 MG/1
7.5 TABLET ORAL DAILY
Qty: 12 TABLET | Refills: 0 | Status: SHIPPED | OUTPATIENT
Start: 2023-10-24

## 2023-10-24 RX ORDER — KETOROLAC TROMETHAMINE 30 MG/ML
15 INJECTION, SOLUTION INTRAMUSCULAR; INTRAVENOUS
Status: COMPLETED | OUTPATIENT
Start: 2023-10-24 | End: 2023-10-24

## 2023-10-24 RX ORDER — ORPHENADRINE CITRATE 100 MG/1
100 TABLET, EXTENDED RELEASE ORAL 2 TIMES DAILY
Qty: 20 TABLET | Refills: 0 | Status: SHIPPED | OUTPATIENT
Start: 2023-10-24 | End: 2023-11-03

## 2023-10-24 RX ADMIN — KETOROLAC TROMETHAMINE 15 MG: 30 INJECTION, SOLUTION INTRAMUSCULAR; INTRAVENOUS at 03:10

## 2023-10-24 NOTE — Clinical Note
"Amber"Servando Bryson was seen and treated in our emergency department on 10/24/2023.  She may return to work on 10/24/2023.       If you have any questions or concerns, please don't hesitate to call.      Rinku TEMPLE    "

## 2023-10-24 NOTE — ED PROVIDER NOTES
"Encounter Date: 10/24/2023    SCRIBE #1 NOTE: I, April Haines, am scribing for, and in the presence of,  Jac Paul PA-C. I have scribed the following portions of the note - Other sections scribed: HPI, ROS, PE.       History     Chief Complaint   Patient presents with    Hip Pain     Pt to ED via personal transportation with complaints of L sided hip pain. Reports painful upon ambulation, denies trauma. Ambulatory in triage. Pain began yesterday.      Patient is a 48 y.o. F, with PMHx of DM type II, who presents to the ED with left aching hip pain radiating to mid thigh onset 1 day ago. Patient reports she can walk but that when she gets out of bed or "gets off of something high" she experiences the hip pain. Denies any recent injuries or falls. No other exacerbating or alleviating factors. Denies fever, chills, nausea, vomiting, chest pain, or other associated symptoms.       The history is provided by the patient. No  was used.     Review of patient's allergies indicates:  No Known Allergies  History reviewed. No pertinent past medical history.  Past Surgical History:   Procedure Laterality Date     SECTION       History reviewed. No pertinent family history.  Social History     Tobacco Use    Smoking status: Never    Smokeless tobacco: Never   Substance Use Topics    Alcohol use: No     Review of Systems   Constitutional:  Negative for chills and fever.   Cardiovascular:  Negative for chest pain.   Gastrointestinal:  Negative for abdominal pain, nausea and vomiting.   Musculoskeletal:  Positive for arthralgias (L hip).   Neurological:  Negative for numbness and headaches.       Physical Exam     Initial Vitals [10/24/23 1315]   BP Pulse Resp Temp SpO2   (!) 142/87 103 18 98 °F (36.7 °C) 100 %      MAP       --         Physical Exam    Nursing note and vitals reviewed.  Neck: Carotid bruit is not present.   Cardiovascular:  Normal rate, regular rhythm and normal heart " sounds.           Pulses:       Dorsalis pedis pulses are 2+ on the right side and 2+ on the left side.   Pulmonary/Chest: Breath sounds normal.   Abdominal: Abdomen is soft. Bowel sounds are normal.   Musculoskeletal:      Comments: Limited ROM of left hip secondary to pain. No erythema or swelling.     Neurological: She is alert and oriented to person, place, and time. She has normal strength. No sensory deficit.   Skin: Skin is warm.         ED Course   Procedures  Labs Reviewed   POCT GLUCOSE - Abnormal; Notable for the following components:       Result Value    POCT Glucose 196 (*)     All other components within normal limits   POCT URINE PREGNANCY          Imaging Results              X-Ray Hip 2 or 3 views Left (with Pelvis when performed) (Final result)  Result time 10/24/23 16:13:05      Final result by Sony Motley MD (10/24/23 16:13:05)                   Impression:      No evidence for acute fracture or bone destruction.    Mild degenerative changes.      Electronically signed by: oSny Motley MD  Date:    10/24/2023  Time:    16:13               Narrative:    EXAMINATION:  XR HIP WITH PELVIS WHEN PERFORMED, 2 OR 3 VIEWS LEFT    CLINICAL HISTORY:  pain;    TECHNIQUE:  AP view of the pelvis and frog leg lateral view of the left hip were performed.    COMPARISON:  None    FINDINGS:  The bones appear intact.  There is no evidence for acute fracture or bone destruction.  There are mild degenerative changes of the hips.  Sacroiliac joints appear unremarkable.  Multiple phleboliths are present within the pelvis.                                       Medications   ketorolac injection 15 mg (15 mg Intramuscular Given 10/24/23 1500)     Medical Decision Making  This is an emergent evaluation of a 48-year-old female with a past medical history of diabetes who presents to the emergency department for evaluation of atraumatic aching left hip pain x 1 day.  Physical exam reveals limited range of motion of  left hip secondary to pain.  No overlying swelling or erythema noted to left hip.  Sensation intact.  2+ DP pulse on the left side.  Neurovascularly intact. Regular rate rhythm without murmurs.  No carotid bruits appreciated on exam. Lungs are clear to auscultation bilaterally.  Abdomen is soft, nontender, non distended, with normal bowel sounds. Differential diagnosis includes but is not limited to fracture, dislocation, sprain.  Considered septic joint but highly doubtful given no joint erythema, swelling, mild range of motion, and no systemic symptoms.  Workup initiated with x-ray of left hip, UPT.  UPT negative.  Ordered Toradol.  Patient has had labs in the last year showing normal renal function.  X-ray shows no acute fracture or dislocation, but does show mild degenerative changes.  Will treat for strain.  Will send home with meloxicam and Norflex. Patient is very well appearing, and in no acute distress. Vital signs are reassuring here in the emergency department, patient is afebrile, breathing comfortable, satting 97 % on room air. Patient is stable for discharge at this time. Patient verbalizes understanding of care plan. All questions and concerns were addressed. Discussed strict return precautions with the patient. Instructed follow up with primary care provider within 1 week.      Jac Paul PA-C    DISCLAIMER: This note was prepared with [employer] voice recognition transcription software. Garbled syntax, mangled pronouns, and other bizarre constructions may be attributed to that software system.     Risk  Prescription drug management.            Scribe Attestation:   Scribe #1: I performed the above scribed service and the documentation accurately describes the services I performed. I attest to the accuracy of the note.                        Clinical Impression:   Final diagnoses:  [M25.552] Pain of left hip (Primary)        ED Disposition Condition    Discharge Stable          ED Prescriptions        Medication Sig Dispense Start Date End Date Auth. Provider    meloxicam (MOBIC) 7.5 MG tablet Take 1 tablet (7.5 mg total) by mouth once daily. 12 tablet 10/24/2023 -- Jac Paul PA-C    orphenadrine (NORFLEX) 100 mg tablet Take 1 tablet (100 mg total) by mouth 2 (two) times daily. for 10 days 20 tablet 10/24/2023 11/3/2023 Jac Paul PA-C          Follow-up Information       Follow up With Specialties Details Why Contact Info    Nancy Chacon MD Pediatrics   [address] [facility] [address]  [facility] [address]  [phone]      [facility] - Emergency Dept Emergency Medicine Go to  As needed, If symptoms worsen, or new symptoms develop [address]  [facility] [address] [phone]          I, Jac Paul PA-C, personally performed the services described in this documentation. All medical record entries made by the scribe were at my direction and in my presence.  I have reviewed the chart and agree that the record reflects my personal performance and is accurate and complete.     Jac Paul PA-C  10/24/23 8907

## 2023-10-24 NOTE — FIRST PROVIDER EVALUATION
"Medical screening examination initiated.  I have conducted a focused provider triage encounter, findings are as follows:    Brief history of present illness:  Left hip pain that started yesterday; denies trauma/injury    Vitals:    10/24/23 1315   BP: (!) 142/87   BP Location: Right arm   Patient Position: Sitting   Pulse: 103   Resp: 18   Temp: 98 °F (36.7 °C)   TempSrc: Oral   SpO2: 100%   Weight: 62.6 kg (138 lb)   Height: 4' 11" (1.499 m)       Pertinent physical exam:  ambulatory with a limp    Brief workup plan:  UPT, CBG, xray    Preliminary workup initiated; this workup will be continued and followed by the physician or advanced practice provider that is assigned to the patient when roomed.  "

## 2023-10-24 NOTE — DISCHARGE INSTRUCTIONS
You may take meloxicam and Norflex as prescribed for left hip pain.  Please follow up with your primary care provider within 1 week.  Thank you for coming to our Emergency Department today. It is important to remember that some problems are difficult to diagnose and may not be found during your Emergency Department visit. Be sure to follow up with your primary care doctor and review all labs/imaging/tests that were performed during this visit with them. Some labs/tests may be outside of the normal range and require non-emergent follow-up and further investigation to help diagnose/exclude/prevent complications or other medical conditions.    If you do not have a primary care doctor, you may contact the one listed on your discharge paperwork or you may also call the [facility] Appointment Desk at [phone] to schedule an appointment and establish care with one. It is important to your health that you have a primary care doctor.    Please take all medications as directed. All medications may potentially have side-effects and it is impossible to predict which medications may give you side-effects or what side-effects (if any) they will give you.. If you feel that you are having a negative effect or side-effect of any medication you should immediately stop taking them and seek medical attention. If you feel that you are having a life-threatening reaction call 911.    Return to the ER with any questions/concerns, new/concerning symptoms, worsening or failure to improve.     Do not drive, swim, climb to height, take a bath or make any important decisions for 24 hours if you have received any pain medications, sedatives or mood altering drugs during your ER visit.

## 2024-02-20 ENCOUNTER — HOSPITAL ENCOUNTER (EMERGENCY)
Facility: HOSPITAL | Age: 49
Discharge: HOME | End: 2024-02-20
Attending: EMERGENCY MEDICINE
Payer: MEDICAID

## 2024-02-20 VITALS
DIASTOLIC BLOOD PRESSURE: 86 MMHG | TEMPERATURE: 98 F | RESPIRATION RATE: 19 BRPM | WEIGHT: 139 LBS | OXYGEN SATURATION: 100 % | BODY MASS INDEX: 28.02 KG/M2 | SYSTOLIC BLOOD PRESSURE: 159 MMHG | HEIGHT: 59 IN | HEART RATE: 89 BPM

## 2024-02-20 DIAGNOSIS — M25.551 RIGHT HIP PAIN: ICD-10-CM

## 2024-02-20 DIAGNOSIS — S76.011A HIP STRAIN, RIGHT, INITIAL ENCOUNTER: Primary | ICD-10-CM

## 2024-02-20 LAB
B-HCG UR QL: NEGATIVE
CTP QC/QA: YES

## 2024-02-20 PROCEDURE — 81025 URINE PREGNANCY TEST: CPT

## 2024-02-20 PROCEDURE — 63600175 PHARM REV CODE 636 W HCPCS: Performed by: EMERGENCY MEDICINE

## 2024-02-20 PROCEDURE — 96372 THER/PROPH/DIAG INJ SC/IM: CPT | Performed by: EMERGENCY MEDICINE

## 2024-02-20 PROCEDURE — 99284 EMERGENCY DEPT VISIT MOD MDM: CPT | Mod: 25

## 2024-02-20 RX ORDER — METHOCARBAMOL 500 MG/1
1000 TABLET, FILM COATED ORAL 3 TIMES DAILY
Qty: 30 TABLET | Refills: 0 | Status: SHIPPED | OUTPATIENT
Start: 2024-02-20 | End: 2024-02-25

## 2024-02-20 RX ORDER — KETOROLAC TROMETHAMINE 10 MG/1
10 TABLET, FILM COATED ORAL EVERY 6 HOURS
Qty: 20 TABLET | Refills: 0 | Status: SHIPPED | OUTPATIENT
Start: 2024-02-20 | End: 2024-02-25

## 2024-02-20 RX ORDER — KETOROLAC TROMETHAMINE 30 MG/ML
30 INJECTION, SOLUTION INTRAMUSCULAR; INTRAVENOUS
Status: COMPLETED | OUTPATIENT
Start: 2024-02-20 | End: 2024-02-20

## 2024-02-20 RX ADMIN — KETOROLAC TROMETHAMINE 30 MG: 30 INJECTION, SOLUTION INTRAMUSCULAR; INTRAVENOUS at 02:02

## 2024-02-20 NOTE — ED NOTES
Amber Bryson, a 48 y.o. female presents to the ED w/ complaint of right upper thigh pain since last night. Pt denies recent injuries or take any new meds.     Triage note:  Chief Complaint   Patient presents with    Leg Pain     Right upper leg/thigh pain since last night. Denies injury.      Review of patient's allergies indicates:  No Known Allergies  History reviewed. No pertinent past medical history.

## 2024-02-20 NOTE — Clinical Note
"Amber"Servando Bryson was seen and treated in our emergency department on 2/20/2024.  She may return to work on 02/22/2024.       If you have any questions or concerns, please don't hesitate to call.      Flako Bland MD"

## 2024-02-20 NOTE — ED PROVIDER NOTES
Encounter Date: 2024       History     Chief Complaint   Patient presents with    Leg Pain     Right upper leg/thigh pain since last night. Denies injury.      HPI    48-year-old female with no reported past medical history presents with right leg pain since last night. 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  She reports no pain or issues with this previously.  She reports taking some Tylenol without a whole lot of significant improvement.  She denies taking any further medications.   No further complaints reported.    Review of patient's allergies indicates:  No Known Allergies  History reviewed. No pertinent past medical history.  Past Surgical History:   Procedure Laterality Date     SECTION       History reviewed. No pertinent family history.  Social History     Tobacco Use    Smoking status: Never    Smokeless tobacco: Never   Substance Use Topics    Alcohol use: No     Review of Systems   Constitutional: Negative.    HENT: Negative.     Eyes: Negative.    Respiratory: Negative.     Cardiovascular: Negative.    Gastrointestinal: Negative.    Genitourinary: Negative.    Musculoskeletal:         Right upper leg pain   Skin: Negative.    Neurological: Negative.        Physical Exam     Initial Vitals [24 1255]   BP Pulse Resp Temp SpO2   (!) 182/64 97 18 98.6 °F (37 °C) 100 %      MAP       --         Physical Exam    Nursing note and vitals reviewed.  Constitutional: She appears well-developed and well-nourished. She is not diaphoretic. No distress.   HENT:   Head: Normocephalic and atraumatic.   Eyes: Pupils are equal, round, and reactive to light. Right eye exhibits no discharge. Left eye exhibits no discharge.   Neck: No tracheal deviation present.   Normal range of motion.  Cardiovascular:  Normal rate  and regular rhythm.           Pulmonary/Chest: No stridor. No respiratory distress.   Musculoskeletal:         General: Tenderness (Tender to palpation over right proximal ASIS with some mild noted muscular spasm, no inguinal lymphadenopathy noted, mild tenderness over right greater trochanter, no overlying skin changes noted.  Distal pulses intact 2+ DP.) present. No edema.      Cervical back: Normal range of motion.     Neurological: She is alert and oriented to person, place, and time.   Skin: Skin is warm and dry.         ED Course   Procedures  Labs Reviewed   POCT URINE PREGNANCY          Imaging Results              X-Ray Hip 2 or 3 views Right (with Pelvis when performed) (Final result)  Result time 02/20/24 14:47:31      Final result by Austin Mcdonald MD (02/20/24 14:47:31)                   Impression:      No acute displaced fracture-dislocation identified.      Electronically signed by: Austin Mcdonald MD  Date:    02/20/2024  Time:    14:47               Narrative:    EXAMINATION:  XR HIP WITH PELVIS WHEN PERFORMED, 2 OR 3  VIEWS RIGHT    CLINICAL HISTORY:  Pain in right hip    TECHNIQUE:  AP view of the pelvis and frog leg lateral view of the right hip were performed.    COMPARISON:  Left hip series 10/24/2023, CT abdomen and pelvis 07/26/2017    FINDINGS:  Bones are well mineralized.  Overall alignment is within normal limits.  No displaced fracture dislocation or destructive osseous process.  Minimal degenerative change.  Pelvic phleboliths noted.  No subcutaneous emphysema or radiodense retained foreign body.                                       Medications   ketorolac injection 30 mg (30 mg Intramuscular Given 2/20/24 4215)     Medical Decision Making  Amount and/or Complexity of Data Reviewed  Radiology: ordered.    Risk  Prescription drug management.      MDM:    48-year-old female with no reported past medical history presents with right leg pain since last night. Differential Diagnosis  includes: DVT,  CHF exacerbation, lymphadenopathy, appendicitis, ureterolithiasis, fracture, bursitis.  Physical exam as noted above.  ED workup notable for negative pregnancy test, hip x-ray of the right unremarkable.  Patient presentation consistent with right hip flexor strain, consistent exam findings unremarkable x-ray, patient is ambulatory with a steady gait, will continue anti-inflammatories and further supportive care at this time.  Do not suspect any additional surgical or medical emergency. Discussed diagnosis and further treatment with patient, including f/u.  Return precautions given and all questions answered.  Patient in understanding of plan.  Pt discharged to home improved and stable.        Note was created using voice recognition software. Note may have occasional typographical or grammatical errors, garbled syntax, and other bizarre constructions that may not have been identified and edited despite good linnea initial review prior to signing.                                       Clinical Impression:  Final diagnoses:  [M25.551] Right hip pain  [S76.011A] Hip strain, right, initial encounter (Primary)          ED Disposition Condition    Discharge Stable          ED Prescriptions       Medication Sig Dispense Start Date End Date Auth. Provider    ketorolac (TORADOL) 10 mg tablet Take 1 tablet (10 mg total) by mouth every 6 (six) hours. for 5 days 20 tablet 2/20/2024 2/25/2024 Flako Bland MD    methocarbamoL (ROBAXIN) 500 MG Tab Take 2 tablets (1,000 mg total) by mouth 3 (three) times daily. for 5 days 30 tablet 2/20/2024 2/25/2024 Flako Bland MD          Follow-up Information       Follow up With Specialties Details Why Contact Info    [facility] [place] - Emergency Dept Emergency Medicine Go to  If symptoms worsen [address] [facility] [place] [address] [phone]    Nancy Chacon MD Pediatrics Go in 1 week As needed [id number] [address]  [phone]               Flako Bland MD  02/23/24 0620

## 2024-02-20 NOTE — ED NOTES
Patient identifiers verified and correct for Ms. Bryson  C/C: leg pain  APPEARANCE: awake and alert in NAD.  SKIN: warm, dry and intact. No breakdown or bruising.  MUSCULOSKELETAL: Patient moving all extremities spontaneously, no obvious swelling or deformities noted. Ambulates independently.  RESPIRATORY: Denies shortness of breath.Respirations unlabored.   CARDIAC: Denies CP,  distal pulses; no peripheral edema  ABDOMEN: denies abdominal pain and n/v/d   : voids spontaneously, denies difficulty  Neurologic: AAO x 4; follows commands equal strength in all extremities; denies numbness/tingling. Denies dizziness

## 2024-04-30 ENCOUNTER — HOSPITAL ENCOUNTER (OUTPATIENT)
Facility: HOSPITAL | Age: 49
Discharge: HOME | End: 2024-05-01
Attending: EMERGENCY MEDICINE | Admitting: HOSPITALIST
Payer: COMMERCIAL

## 2024-04-30 DIAGNOSIS — D64.9 ANEMIA: Primary | ICD-10-CM

## 2024-04-30 PROBLEM — D62 ACUTE BLOOD LOSS ANEMIA: Status: ACTIVE | Noted: 2024-04-30

## 2024-04-30 PROBLEM — N92.0 MENORRHAGIA: Status: ACTIVE | Noted: 2024-04-30

## 2024-04-30 LAB
ABO + RH BLD: NORMAL
ABO + RH BLD: NORMAL
ALBUMIN SERPL BCP-MCNC: 3.8 G/DL (ref 3.5–5.2)
ALP SERPL-CCNC: 96 U/L (ref 55–135)
ALT SERPL W/O P-5'-P-CCNC: 10 U/L (ref 10–44)
ANION GAP SERPL CALC-SCNC: 8 MMOL/L (ref 8–16)
APTT PPP: 23.2 SEC (ref 21–32)
AST SERPL-CCNC: 15 U/L (ref 10–40)
B-HCG UR QL: NEGATIVE
BACTERIA #/AREA URNS HPF: ABNORMAL /HPF
BASOPHILS # BLD AUTO: 0.02 K/UL (ref 0–0.2)
BASOPHILS NFR BLD: 0.2 % (ref 0–1.9)
BILIRUB SERPL-MCNC: 0.9 MG/DL (ref 0.1–1)
BILIRUB UR QL STRIP: NEGATIVE
BLD GP AB SCN CELLS X3 SERPL QL: NORMAL
BLD GP AB SCN CELLS X3 SERPL QL: NORMAL
BLD PROD TYP BPU: NORMAL
BLD PROD TYP BPU: NORMAL
BLOOD UNIT EXPIRATION DATE: NORMAL
BLOOD UNIT EXPIRATION DATE: NORMAL
BLOOD UNIT TYPE CODE: 5100
BLOOD UNIT TYPE CODE: 5100
BLOOD UNIT TYPE: NORMAL
BLOOD UNIT TYPE: NORMAL
BUN SERPL-MCNC: 14 MG/DL (ref 6–20)
CALCIUM SERPL-MCNC: 10.3 MG/DL (ref 8.7–10.5)
CHLORIDE SERPL-SCNC: 105 MMOL/L (ref 95–110)
CLARITY UR: CLEAR
CO2 SERPL-SCNC: 26 MMOL/L (ref 23–29)
CODING SYSTEM: NORMAL
CODING SYSTEM: NORMAL
COLOR UR: YELLOW
CREAT SERPL-MCNC: 0.9 MG/DL (ref 0.5–1.4)
CROSSMATCH INTERPRETATION: NORMAL
CROSSMATCH INTERPRETATION: NORMAL
CTP QC/QA: YES
DIFFERENTIAL METHOD BLD: ABNORMAL
DISPENSE STATUS: NORMAL
DISPENSE STATUS: NORMAL
EOSINOPHIL # BLD AUTO: 0.1 K/UL (ref 0–0.5)
EOSINOPHIL NFR BLD: 1 % (ref 0–8)
ERYTHROCYTE [DISTWIDTH] IN BLOOD BY AUTOMATED COUNT: 18.1 % (ref 11.5–14.5)
EST. GFR  (NO RACE VARIABLE): >60 ML/MIN/1.73 M^2
GLUCOSE SERPL-MCNC: 166 MG/DL (ref 70–110)
GLUCOSE UR QL STRIP: ABNORMAL
HCT VFR BLD AUTO: 21.5 % (ref 37–48.5)
HGB BLD-MCNC: 6.1 G/DL (ref 12–16)
HGB UR QL STRIP: ABNORMAL
IMM GRANULOCYTES # BLD AUTO: 0.03 K/UL (ref 0–0.04)
IMM GRANULOCYTES NFR BLD AUTO: 0.4 % (ref 0–0.5)
INR PPP: 1 (ref 0.8–1.2)
KETONES UR QL STRIP: NEGATIVE
LEUKOCYTE ESTERASE UR QL STRIP: ABNORMAL
LYMPHOCYTES # BLD AUTO: 1.9 K/UL (ref 1–4.8)
LYMPHOCYTES NFR BLD: 23.3 % (ref 18–48)
MCH RBC QN AUTO: 19.5 PG (ref 27–31)
MCHC RBC AUTO-ENTMCNC: 28.4 G/DL (ref 32–36)
MCV RBC AUTO: 69 FL (ref 82–98)
MICROSCOPIC COMMENT: ABNORMAL
MONOCYTES # BLD AUTO: 0.5 K/UL (ref 0.3–1)
MONOCYTES NFR BLD: 6.2 % (ref 4–15)
NEUTROPHILS # BLD AUTO: 5.6 K/UL (ref 1.8–7.7)
NEUTROPHILS NFR BLD: 68.9 % (ref 38–73)
NITRITE UR QL STRIP: NEGATIVE
NRBC BLD-RTO: 0 /100 WBC
PH UR STRIP: 6 [PH] (ref 5–8)
PLATELET # BLD AUTO: 532 K/UL (ref 150–450)
PMV BLD AUTO: 9.1 FL (ref 9.2–12.9)
POTASSIUM SERPL-SCNC: 3.3 MMOL/L (ref 3.5–5.1)
PROT SERPL-MCNC: 8.6 G/DL (ref 6–8.4)
PROT UR QL STRIP: NEGATIVE
PROTHROMBIN TIME: 10.8 SEC (ref 9–12.5)
RBC # BLD AUTO: 3.13 M/UL (ref 4–5.4)
RBC #/AREA URNS HPF: 5 /HPF (ref 0–4)
SODIUM SERPL-SCNC: 139 MMOL/L (ref 136–145)
SP GR UR STRIP: 1.01 (ref 1–1.03)
SQUAMOUS #/AREA URNS HPF: 14 /HPF
TRANS ERYTHROCYTES VOL PATIENT: NORMAL ML
TRANS ERYTHROCYTES VOL PATIENT: NORMAL ML
TROPONIN I SERPL DL<=0.01 NG/ML-MCNC: <0.006 NG/ML (ref 0–0.03)
URN SPEC COLLECT METH UR: ABNORMAL
UROBILINOGEN UR STRIP-ACNC: NEGATIVE EU/DL
WBC # BLD AUTO: 8.1 K/UL (ref 3.9–12.7)
WBC #/AREA URNS HPF: 7 /HPF (ref 0–5)
YEAST URNS QL MICRO: ABNORMAL

## 2024-04-30 PROCEDURE — 25000003 PHARM REV CODE 250: Performed by: HOSPITALIST

## 2024-04-30 PROCEDURE — 80053 COMPREHEN METABOLIC PANEL: CPT | Performed by: EMERGENCY MEDICINE

## 2024-04-30 PROCEDURE — G0378 HOSPITAL OBSERVATION PER HR: HCPCS

## 2024-04-30 PROCEDURE — 25000003 PHARM REV CODE 250: Performed by: PHYSICIAN ASSISTANT

## 2024-04-30 PROCEDURE — 36430 TRANSFUSION BLD/BLD COMPNT: CPT

## 2024-04-30 PROCEDURE — P9021 RED BLOOD CELLS UNIT: HCPCS | Performed by: EMERGENCY MEDICINE

## 2024-04-30 PROCEDURE — 85730 THROMBOPLASTIN TIME PARTIAL: CPT | Performed by: EMERGENCY MEDICINE

## 2024-04-30 PROCEDURE — 85025 COMPLETE CBC W/AUTO DIFF WBC: CPT | Performed by: EMERGENCY MEDICINE

## 2024-04-30 PROCEDURE — 81000 URINALYSIS NONAUTO W/SCOPE: CPT | Performed by: EMERGENCY MEDICINE

## 2024-04-30 PROCEDURE — 84484 ASSAY OF TROPONIN QUANT: CPT | Performed by: PHYSICIAN ASSISTANT

## 2024-04-30 PROCEDURE — 99285 EMERGENCY DEPT VISIT HI MDM: CPT | Mod: 25

## 2024-04-30 PROCEDURE — 86920 COMPATIBILITY TEST SPIN: CPT | Performed by: EMERGENCY MEDICINE

## 2024-04-30 PROCEDURE — 86850 RBC ANTIBODY SCREEN: CPT | Mod: 91 | Performed by: HOSPITALIST

## 2024-04-30 PROCEDURE — 81025 URINE PREGNANCY TEST: CPT | Performed by: EMERGENCY MEDICINE

## 2024-04-30 PROCEDURE — 86901 BLOOD TYPING SEROLOGIC RH(D): CPT | Performed by: EMERGENCY MEDICINE

## 2024-04-30 PROCEDURE — 85610 PROTHROMBIN TIME: CPT | Performed by: EMERGENCY MEDICINE

## 2024-04-30 PROCEDURE — 86900 BLOOD TYPING SEROLOGIC ABO: CPT | Mod: 91 | Performed by: HOSPITALIST

## 2024-04-30 PROCEDURE — 86901 BLOOD TYPING SEROLOGIC RH(D): CPT | Mod: 91 | Performed by: HOSPITALIST

## 2024-04-30 RX ORDER — ONDANSETRON 4 MG/1
4 TABLET, ORALLY DISINTEGRATING ORAL ONCE
Status: COMPLETED | OUTPATIENT
Start: 2024-04-30 | End: 2024-04-30

## 2024-04-30 RX ORDER — METFORMIN HYDROCHLORIDE 1000 MG/1
1000 TABLET ORAL 2 TIMES DAILY
COMMUNITY

## 2024-04-30 RX ORDER — AMLODIPINE BESYLATE 5 MG/1
5 TABLET ORAL DAILY
Status: DISCONTINUED | OUTPATIENT
Start: 2024-04-30 | End: 2024-05-01 | Stop reason: HOSPADM

## 2024-04-30 RX ORDER — DIPHENHYDRAMINE HCL 25 MG
25 CAPSULE ORAL ONCE
Status: COMPLETED | OUTPATIENT
Start: 2024-04-30 | End: 2024-04-30

## 2024-04-30 RX ORDER — HYDROCODONE BITARTRATE AND ACETAMINOPHEN 500; 5 MG/1; MG/1
TABLET ORAL
Status: DISCONTINUED | OUTPATIENT
Start: 2024-04-30 | End: 2024-05-01 | Stop reason: HOSPADM

## 2024-04-30 RX ORDER — CLONIDINE HYDROCHLORIDE 0.1 MG/1
0.1 TABLET ORAL EVERY 4 HOURS PRN
Status: DISCONTINUED | OUTPATIENT
Start: 2024-04-30 | End: 2024-05-01 | Stop reason: HOSPADM

## 2024-04-30 RX ADMIN — DIPHENHYDRAMINE HYDROCHLORIDE 25 MG: 25 CAPSULE ORAL at 07:04

## 2024-04-30 RX ADMIN — AMLODIPINE BESYLATE 5 MG: 5 TABLET ORAL at 01:04

## 2024-04-30 RX ADMIN — CLONIDINE HYDROCHLORIDE 0.1 MG: 0.1 TABLET ORAL at 03:04

## 2024-04-30 RX ADMIN — ONDANSETRON 4 MG: 4 TABLET, ORALLY DISINTEGRATING ORAL at 07:04

## 2024-04-30 NOTE — HPI
49 yo F w/ PMHx menorrhalgia presenting to the ED for abnormal hemoglobin. Patient reports she had routine labs done outside this facility yesterday and was told this morning her hemoglobin was 5.7 prompting her to the ED for evaluation. She also reports associated headaches, fatigue, mild dysuria. Notes she suffers from heavy menstrual periods where she bleeds for 7 days and goes through 7 pads daily. She denies any rectal bleeding, dark tarry stools, melena or hematochezia. Had recent normal colonoscopy. She is not on any oral contraception. No other exacerbating or alleviating factors. Patient denies cough, shortness of breath, chest pain, fever, chills, abdominal pain, nausea, vomiting, diarrhea, congestion, sore throat, arm or leg trouble, eye pain, ear pain, rash, or other associated symptoms. Patient has HH of 6/21,denies any bleeding at this time,2 PRBC is ordered.

## 2024-04-30 NOTE — H&P
[facility] Medicine  History & Physical    Patient Name: Amber Bryson  MRN: [id number]  Patient Class: OP- Observation  Admission Date: 2024  Attending Physician: Cami Slater, *   Primary Care Provider: Nancy Chacon MD         Patient information was obtained from patient and ER records.     Subjective:     Principal Problem:Acute blood loss anemia    Chief Complaint:   Chief Complaint   Patient presents with    Abnormal Lab     Pt sent to ED from PCP. Pt had labs drawn yesterday. Hgb 5.7. Pt reports fatigue. Denies any dizziness. No hx of blood transfusion. Lip swelling noted in triage. Airway patent. Pt also reports headache        HPI: 49 yo F w/ PMHx menorrhalgia presenting to the ED for abnormal hemoglobin. Patient reports she had routine labs done outside this facility yesterday and was told this morning her hemoglobin was 5.7 prompting her to the ED for evaluation. She also reports associated headaches, fatigue, mild dysuria. Notes she suffers from heavy menstrual periods where she bleeds for 7 days and goes through 7 pads daily. She denies any rectal bleeding, dark tarry stools, melena or hematochezia. Had recent normal colonoscopy. She is not on any oral contraception. No other exacerbating or alleviating factors. Patient denies cough, shortness of breath, chest pain, fever, chills, abdominal pain, nausea, vomiting, diarrhea, congestion, sore throat, arm or leg trouble, eye pain, ear pain, rash, or other associated symptoms. Patient has HH of 6/21,denies any bleeding at this time,2 PRBC is ordered.    No past medical history on file.    Past Surgical History:   Procedure Laterality Date     SECTION         Review of patient's allergies indicates:  No Known Allergies    Current Facility-Administered Medications   Medication Dose Route Frequency Provider Last Rate Last Admin    0.9%  NaCl infusion (for blood administration)   Intravenous Q24H PRN  Victorino Rinaldi MD        0.9%  NaCl infusion (for blood administration)   Intravenous Q24H PRN Cami Slater MD        amLODIPine tablet 5 mg  5 mg Oral Daily Cami Slater MD        cloNIDine tablet 0.1 mg  0.1 mg Oral Q4H PRN Cami Slater MD         Current Outpatient Medications   Medication Sig Dispense Refill    amLODIPine (NORVASC) 5 MG tablet Take 1 tablet (5 mg total) by mouth once daily. 30 tablet 0    meloxicam (MOBIC) 7.5 MG tablet Take 1 tablet (7.5 mg total) by mouth once daily. 12 tablet 0     Family History    None       Tobacco Use    Smoking status: Never    Smokeless tobacco: Never   Substance and Sexual Activity    Alcohol use: No    Drug use: Not on file    Sexual activity: Not on file     Review of Systems   Constitutional:  Positive for activity change and appetite change.   HENT:  Negative for congestion and dental problem.    Eyes:  Negative for discharge and itching.   Respiratory:  Negative for apnea and chest tightness.    Cardiovascular:  Negative for chest pain and leg swelling.   Gastrointestinal:  Negative for abdominal distention and abdominal pain.   Endocrine: Negative for cold intolerance.   Genitourinary:  Negative for difficulty urinating and dyspareunia.   Musculoskeletal:  Negative for arthralgias and back pain.   Skin:  Negative for color change and pallor.   Allergic/Immunologic: Negative for environmental allergies and food allergies.   Neurological:  Positive for weakness.   Psychiatric/Behavioral:  Negative for agitation and behavioral problems.      Objective:     Vital Signs (Most Recent):  Temp: 98.8 °F (37.1 °C) (04/30/24 0947)  Pulse: 96 (04/30/24 0947)  Resp: 18 (04/30/24 0947)  BP: (!) 144/90 (04/30/24 0947)  SpO2: 100 % (04/30/24 0947) Vital Signs (24h Range):  Temp:  [98.8 °F (37.1 °C)] 98.8 °F (37.1 °C)  Pulse:  [96] 96  Resp:  [18] 18  SpO2:  [100 %] 100 %  BP: (144)/(90) 144/90     Weight: 61.7 kg (136 lb)  Body mass index is  27.47 kg/m².     Physical Exam  HENT:      Head: Atraumatic.      Nose: Nose normal.      Mouth/Throat:      Mouth: Mucous membranes are moist.   Eyes:      Extraocular Movements: Extraocular movements intact.      Pupils: Pupils are equal, round, and reactive to light.   Cardiovascular:      Rate and Rhythm: Normal rate and regular rhythm.      Heart sounds: No murmur heard.     No gallop.   Pulmonary:      Effort: No respiratory distress.      Breath sounds: No wheezing.   Abdominal:      General: There is no distension.      Tenderness: There is no abdominal tenderness.   Musculoskeletal:         General: No swelling.      Cervical back: Normal range of motion and neck supple.      Right lower leg: No edema.   Skin:     Coloration: Skin is not jaundiced.      Findings: No bruising.   Neurological:      Mental Status: She is alert and oriented to person, place, and time.   Psychiatric:         Mood and Affect: Mood normal.         Behavior: Behavior normal.              CRANIAL NERVES     CN III, IV, VI   Pupils are equal, round, and reactive to light.       Significant Labs: All pertinent labs within the past 24 hours have been reviewed.  BMP:   Recent Labs   Lab 04/30/24  1037   *      K 3.3*      CO2 26   BUN 14   CREATININE 0.9   CALCIUM 10.3     CBC:   Recent Labs   Lab 04/30/24  1037   WBC 8.10   HGB 6.1*   HCT 21.5*   *     CMP:   Recent Labs   Lab 04/30/24  1037      K 3.3*      CO2 26   *   BUN 14   CREATININE 0.9   CALCIUM 10.3   PROT 8.6*   ALBUMIN 3.8   BILITOT 0.9   ALKPHOS 96   AST 15   ALT 10   ANIONGAP 8       Significant Imaging: I have reviewed all pertinent imaging results/findings within the past 24 hours.  Assessment/Plan:     * Acute blood loss anemia  Patient has HH of 6/21,denies any bleeding at this time,2 PRBC is ordered.need OB follow up at DC time.    Menorrhagia  As above,        VTE Risk Mitigation (From admission, onward)      None                On 04/30/2024, patient should be placed in hospital observation services under my care.        AdmissionCare    Guideline: Anemia (Iron Deficiency or Unspecified) - OBS, Observation    Based on the indications selected for the patient, the bed status of Observation was determined to be MET    The following indications were selected as present at the time of evaluation of the patient:      - Active bleeding present or cannot be excluded (eg, need for serial hematocrit measurements)    AdmissionCare documentation entered by: Joey Esquivel    [facility], 27th edition, Copyright © 2023 [facility], [facility] All Rights Reserved.  [id number]:18:39-05:00    Cami Slater MD  Department of Hospital Medicine  [facility] - [place] - Emergency Dept

## 2024-04-30 NOTE — ED PROVIDER NOTES
Encounter Date: 2024    SCRIBE #1 NOTE: I, Amanda Tamayo, am scribing for, and in the presence of,  Victorino Rinaldi MD.       History     Chief Complaint   Patient presents with    Abnormal Lab     Pt sent to ED from PCP. Pt had labs drawn yesterday. Hgb 5.7. Pt reports fatigue. Denies any dizziness. No hx of blood transfusion. Lip swelling noted in triage. Airway patent. Pt also reports headache     47 yo F w/ PMHx of DM presenting to the ED for abnormal hemoglobin. Patient reports she had routine labs done outside this facility yesterday and was told this morning her hemoglobin was 5.7 prompting her to the ED for evaluation. She also reports associated headaches, fatigue, mild dysuria. Notes she suffers from heavy menstrual periods where she bleeds for 7 days and goes through 7 pads daily. She denies any rectal bleeding, dark tarry stools, melena or hematochezia. Had recent normal colonoscopy. She is not on any oral contraception. No other exacerbating or alleviating factors. Patient denies cough, shortness of breath, chest pain, fever, chills, abdominal pain, nausea, vomiting, diarrhea, congestion, sore throat, arm or leg trouble, eye pain, ear pain, rash, or other associated symptoms.    The history is provided by the patient.     Review of patient's allergies indicates:  No Known Allergies  No past medical history on file.  Past Surgical History:   Procedure Laterality Date     SECTION       No family history on file.  Social History     Tobacco Use    Smoking status: Never    Smokeless tobacco: Never   Substance Use Topics    Alcohol use: No     Review of Systems   Constitutional:  Positive for fatigue. Negative for chills, diaphoresis and fever.   HENT:  Negative for ear pain and sore throat.    Eyes:  Negative for pain.   Respiratory:  Negative for cough and shortness of breath.    Cardiovascular:  Negative for chest pain.   Gastrointestinal:  Negative for abdominal pain, diarrhea, nausea and  vomiting.   Genitourinary:  Positive for dysuria and menstrual problem.   Musculoskeletal:  Negative for back pain.        (-) Arm or leg trouble.    Skin:  Negative for rash.   Neurological:  Positive for headaches.   Psychiatric/Behavioral:  Negative for confusion.        Physical Exam     Initial Vitals [04/30/24 0947]   BP Pulse Resp Temp SpO2   (!) 144/90 96 18 98.8 °F (37.1 °C) 100 %      MAP       --         Physical Exam  The patient was examined specifically for the following:   General:No significant distress, Good color, Warm and dry. Head and neck:Scalp atraumatic, Neck supple. Neurological:Appropriate conversation, Gross motor deficits. Eyes:Conjugate gaze, Clear corneas. ENT: No epistaxis. Cardiac: Regular rate and rhythm, Grossly normal heart tones. Pulmonary: Wheezing, Rales. Gastrointestinal: Abdominal tenderness, Abdominal distention. Musculoskeletal: Extremity deformity, Apparent pain with range of motion of the joints. Skin: Rash.   The findings on examination were normal except for the following:  The patient is pale.  Lungs are clear.  The heart tones are normal.  The abdomen is soft.  Vital signs are stable.  ED Course   Critical Care    Date/Time: 5/24/2024 9:27 AM    Performed by: Victorino Rinaldi MD  Authorized by: Cami Slater MD  Direct patient critical care time: 22 minutes  Additional history critical care time: 11 minutes  Ordering / reviewing critical care time: 11 minutes  Documentation critical care time: 11 minutes  Consulting other physicians critical care time: 11 minutes  Total critical care time (exclusive of procedural time) : 66 minutes  Critical care time was exclusive of separately billable procedures and treating other patients and teaching time.  Critical care was necessary to treat or prevent imminent or life-threatening deterioration of the following conditions: metabolic crisis.  Critical care was time spent personally by me on the following activities:  development of treatment plan with patient or surrogate, discussions with primary provider, examination of patient, obtaining history from patient or surrogate, ordering and performing treatments and interventions, ordering and review of laboratory studies and review of old charts.        Labs Reviewed   COMPREHENSIVE METABOLIC PANEL - Abnormal; Notable for the following components:       Result Value    Potassium 3.3 (*)     Glucose 166 (*)     Total Protein 8.6 (*)     All other components within normal limits   CBC W/ AUTO DIFFERENTIAL - Abnormal; Notable for the following components:    RBC 3.13 (*)     Hemoglobin 6.1 (*)     Hematocrit 21.5 (*)     MCV 69 (*)     MCH 19.5 (*)     MCHC 28.4 (*)     RDW 18.1 (*)     Platelets 532 (*)     MPV 9.1 (*)     All other components within normal limits   URINALYSIS, REFLEX TO URINE CULTURE - Abnormal; Notable for the following components:    Glucose, UA 4+ (*)     Occult Blood UA 2+ (*)     Leukocytes, UA 1+ (*)     All other components within normal limits    Narrative:     Specimen Source->Urine   URINALYSIS MICROSCOPIC - Abnormal; Notable for the following components:    RBC, UA 5 (*)     WBC, UA 7 (*)     All other components within normal limits    Narrative:     Specimen Source->Urine   PROTIME-INR   APTT   POCT URINE PREGNANCY   TYPE AND SCREEN PREOP   PREPARE RBC SOFT   PREPARE RBC SOFT          Imaging Results    None          Medications   0.9%  NaCl infusion (for blood administration) (has no administration in time range)   amLODIPine tablet 5 mg (has no administration in time range)   cloNIDine tablet 0.1 mg (has no administration in time range)   0.9%  NaCl infusion (for blood administration) (has no administration in time range)     Medical Decision Making  Amount and/or Complexity of Data Reviewed  Labs: ordered. Decision-making details documented in ED Course.    Risk  Prescription drug management.    Given the above this patient presents emergency room  with feeling weak tired and occasionally short of breath.  She has a hemoglobin of 6.1.  She bleed 7 days with menses.  She has a 7 pads a day and passes clots.  I believe this is likely the cause of her anemia.  I will place the patient to observation for transfusion.  GI bleeding is considered but seems less likely.  The patient is not pregnant.  I doubt ectopic pregnancy.  There is no fever.  I doubt other causes of weakness.  All of the disease entities mentioned above are considered in the differential diagnosis.        Scribe Attestation:   Scribe #1: I performed the above scribed service and the documentation accurately describes the services I performed. I attest to the accuracy of the note.                           Please note that the documentation on this chart was provided by the scribe above on the date of service noted above, and that the documentation in the chart accurately reflects the work and decisions made by me alone.  Signed, Dr. Rinaldi      Clinical Impression:  Final diagnoses:  [D64.9] Anemia          ED Disposition Condition    Observation                 Victorino Rinaldi MD  04/30/24 1257       Victorino Rinaldi MD  05/24/24 0927

## 2024-04-30 NOTE — ED NOTES
Pt presents to ER with c/o of low H/H. States she had routine blood work done yesterday and her pcp called saying she needs a blood transfusion. Pt states her only complaint is generalized fatigue. No hx of blood transfusions. States her periods have been heavier than normal. States she saturates 7 pads/day and her cycle last 7 days. Vss upon arrival.

## 2024-04-30 NOTE — ADMISSIONCARE
AdmissionCare    Guideline: Anemia (Iron Deficiency or Unspecified) - OBS, Observation    Based on the indications selected for the patient, the bed status of Observation was determined to be MET    The following indications were selected as present at the time of evaluation of the patient:      - Active bleeding present or cannot be excluded (eg, need for serial hematocrit measurements)    AdmissionCare documentation entered by: Joey Esquivel    [facility], 27th edition, Copyright © 2023 [facility], [facility] All Rights Reserved.  [id number]:18:39-05:00

## 2024-04-30 NOTE — SUBJECTIVE & OBJECTIVE
No past medical history on file.    Past Surgical History:   Procedure Laterality Date     SECTION         Review of patient's allergies indicates:  No Known Allergies    Current Facility-Administered Medications   Medication Dose Route Frequency Provider Last Rate Last Admin    0.9%  NaCl infusion (for blood administration)   Intravenous Q24H PRN Victorino Rinaldi MD        0.9%  NaCl infusion (for blood administration)   Intravenous Q24H PRN Cami Slater MD        amLODIPine tablet 5 mg  5 mg Oral Daily Cami Slater MD        cloNIDine tablet 0.1 mg  0.1 mg Oral Q4H PRN Cami Slater MD         Current Outpatient Medications   Medication Sig Dispense Refill    amLODIPine (NORVASC) 5 MG tablet Take 1 tablet (5 mg total) by mouth once daily. 30 tablet 0    meloxicam (MOBIC) 7.5 MG tablet Take 1 tablet (7.5 mg total) by mouth once daily. 12 tablet 0     Family History    None       Tobacco Use    Smoking status: Never    Smokeless tobacco: Never   Substance and Sexual Activity    Alcohol use: No    Drug use: Not on file    Sexual activity: Not on file     Review of Systems   Constitutional:  Positive for activity change and appetite change.   HENT:  Negative for congestion and dental problem.    Eyes:  Negative for discharge and itching.   Respiratory:  Negative for apnea and chest tightness.    Cardiovascular:  Negative for chest pain and leg swelling.   Gastrointestinal:  Negative for abdominal distention and abdominal pain.   Endocrine: Negative for cold intolerance.   Genitourinary:  Negative for difficulty urinating and dyspareunia.   Musculoskeletal:  Negative for arthralgias and back pain.   Skin:  Negative for color change and pallor.   Allergic/Immunologic: Negative for environmental allergies and food allergies.   Neurological:  Positive for weakness.   Psychiatric/Behavioral:  Negative for agitation and behavioral problems.      Objective:     Vital Signs (Most  Recent):  Temp: 98.8 °F (37.1 °C) (04/30/24 0947)  Pulse: 96 (04/30/24 0947)  Resp: 18 (04/30/24 0947)  BP: (!) 144/90 (04/30/24 0947)  SpO2: 100 % (04/30/24 0947) Vital Signs (24h Range):  Temp:  [98.8 °F (37.1 °C)] 98.8 °F (37.1 °C)  Pulse:  [96] 96  Resp:  [18] 18  SpO2:  [100 %] 100 %  BP: (144)/(90) 144/90     Weight: 61.7 kg (136 lb)  Body mass index is 27.47 kg/m².     Physical Exam  HENT:      Head: Atraumatic.      Nose: Nose normal.      Mouth/Throat:      Mouth: Mucous membranes are moist.   Eyes:      Extraocular Movements: Extraocular movements intact.      Pupils: Pupils are equal, round, and reactive to light.   Cardiovascular:      Rate and Rhythm: Normal rate and regular rhythm.      Heart sounds: No murmur heard.     No gallop.   Pulmonary:      Effort: No respiratory distress.      Breath sounds: No wheezing.   Abdominal:      General: There is no distension.      Tenderness: There is no abdominal tenderness.   Musculoskeletal:         General: No swelling.      Cervical back: Normal range of motion and neck supple.      Right lower leg: No edema.   Skin:     Coloration: Skin is not jaundiced.      Findings: No bruising.   Neurological:      Mental Status: She is alert and oriented to person, place, and time.   Psychiatric:         Mood and Affect: Mood normal.         Behavior: Behavior normal.              CRANIAL NERVES     CN III, IV, VI   Pupils are equal, round, and reactive to light.       Significant Labs: All pertinent labs within the past 24 hours have been reviewed.  BMP:   Recent Labs   Lab 04/30/24  1037   *      K 3.3*      CO2 26   BUN 14   CREATININE 0.9   CALCIUM 10.3     CBC:   Recent Labs   Lab 04/30/24  1037   WBC 8.10   HGB 6.1*   HCT 21.5*   *     CMP:   Recent Labs   Lab 04/30/24  1037      K 3.3*      CO2 26   *   BUN 14   CREATININE 0.9   CALCIUM 10.3   PROT 8.6*   ALBUMIN 3.8   BILITOT 0.9   ALKPHOS 96   AST 15   ALT 10    ANIONGAP 8       Significant Imaging: I have reviewed all pertinent imaging results/findings within the past 24 hours.

## 2024-04-30 NOTE — ASSESSMENT & PLAN NOTE
Patient has HH of 6/21,denies any bleeding at this time,2 PRBC is ordered.need OB follow up at DC time.

## 2024-05-01 VITALS
DIASTOLIC BLOOD PRESSURE: 84 MMHG | BODY MASS INDEX: 27.47 KG/M2 | OXYGEN SATURATION: 97 % | WEIGHT: 136 LBS | HEART RATE: 91 BPM | TEMPERATURE: 99 F | RESPIRATION RATE: 14 BRPM | SYSTOLIC BLOOD PRESSURE: 130 MMHG

## 2024-05-01 LAB
BASOPHILS # BLD AUTO: 0.02 K/UL (ref 0–0.2)
BASOPHILS NFR BLD: 0.2 % (ref 0–1.9)
DIFFERENTIAL METHOD BLD: ABNORMAL
EOSINOPHIL # BLD AUTO: 0.1 K/UL (ref 0–0.5)
EOSINOPHIL NFR BLD: 0.7 % (ref 0–8)
ERYTHROCYTE [DISTWIDTH] IN BLOOD BY AUTOMATED COUNT: 19.2 % (ref 11.5–14.5)
HCT VFR BLD AUTO: 34.1 % (ref 37–48.5)
HGB BLD-MCNC: 9.6 G/DL (ref 12–16)
IMM GRANULOCYTES # BLD AUTO: 0.05 K/UL (ref 0–0.04)
IMM GRANULOCYTES NFR BLD AUTO: 0.6 % (ref 0–0.5)
LYMPHOCYTES # BLD AUTO: 1.9 K/UL (ref 1–4.8)
LYMPHOCYTES NFR BLD: 22.3 % (ref 18–48)
MCH RBC QN AUTO: 21.6 PG (ref 27–31)
MCHC RBC AUTO-ENTMCNC: 28.2 G/DL (ref 32–36)
MCV RBC AUTO: 77 FL (ref 82–98)
MONOCYTES # BLD AUTO: 0.4 K/UL (ref 0.3–1)
MONOCYTES NFR BLD: 4.4 % (ref 4–15)
NEUTROPHILS # BLD AUTO: 6.1 K/UL (ref 1.8–7.7)
NEUTROPHILS NFR BLD: 71.8 % (ref 38–73)
NRBC BLD-RTO: 0 /100 WBC
PLATELET # BLD AUTO: 514 K/UL (ref 150–450)
PMV BLD AUTO: 9.3 FL (ref 9.2–12.9)
RBC # BLD AUTO: 4.45 M/UL (ref 4–5.4)
TROPONIN I SERPL DL<=0.01 NG/ML-MCNC: <0.006 NG/ML (ref 0–0.03)
TROPONIN I SERPL DL<=0.01 NG/ML-MCNC: <0.006 NG/ML (ref 0–0.03)
WBC # BLD AUTO: 8.49 K/UL (ref 3.9–12.7)

## 2024-05-01 PROCEDURE — G0378 HOSPITAL OBSERVATION PER HR: HCPCS

## 2024-05-01 PROCEDURE — 85025 COMPLETE CBC W/AUTO DIFF WBC: CPT | Performed by: HOSPITALIST

## 2024-05-01 PROCEDURE — 84484 ASSAY OF TROPONIN QUANT: CPT | Mod: 91 | Performed by: PHYSICIAN ASSISTANT

## 2024-05-01 PROCEDURE — 84484 ASSAY OF TROPONIN QUANT: CPT | Performed by: PHYSICIAN ASSISTANT

## 2024-05-01 NOTE — NURSING
Pt c/o increased generalized itching after restarting blood transfusion. Douglas STANTON PA to bedside to see pt. transfusion stopped.

## 2024-05-01 NOTE — NURSING
Pt c/o chest discomfort after ambulating to bathroom. Denies SOB. Respirations even and unlabored. Pt talking in complete sentences to family at bedside. Alert and orientated. VS charted on flowsheet. EKG completed. Douglas VENTURA notified Troponin ordered and collect. CXR ordered. Transfusion reaction workup in progress.

## 2024-05-01 NOTE — DISCHARGE SUMMARY
[facility] Emergency Dept  Hospital Medicine  Discharge Summary      Patient Name: Amber Bryson  MRN: [id number]  [facility]: [id number]  Patient Class: OP- Observation  Admission Date: 4/30/2024  Hospital Length of Stay: 0 days  Discharge Date and Time:  05/01/2024 11:15 AM  Attending Physician: Cami Slater, *   Discharging Provider: Cami Slater MD  Primary Care Provider: Nancy Chacon MD    Primary Care Team: Networked reference to record PCT     HPI:   47 yo F w/ PMHx menorrhalgia presenting to the ED for abnormal hemoglobin. Patient reports she had routine labs done outside this facility yesterday and was told this morning her hemoglobin was 5.7 prompting her to the ED for evaluation. She also reports associated headaches, fatigue, mild dysuria. Notes she suffers from heavy menstrual periods where she bleeds for 7 days and goes through 7 pads daily. She denies any rectal bleeding, dark tarry stools, melena or hematochezia. Had recent normal colonoscopy. She is not on any oral contraception. No other exacerbating or alleviating factors. Patient denies cough, shortness of breath, chest pain, fever, chills, abdominal pain, nausea, vomiting, diarrhea, congestion, sore throat, arm or leg trouble, eye pain, ear pain, rash, or other associated symptoms. Patient has HH of 6/21,denies any bleeding at this time,2 PRBC is ordered.    * No surgery found *      Hospital Course:   47 yo F w/ PMHx menorrhalgia presenting to the ED for abnormal hemoglobin. Patient reports she had routine labs done outside this facility yesterday and was told this morning her hemoglobin was 5.7 prompting her to the ED for evaluation. She also reports associated headaches, fatigue, mild dysuria. Notes she suffers from heavy menstrual periods where she bleeds for 7 days and goes through 7 pads daily. She denies any rectal bleeding, dark tarry stools, melena or hematochezia. Had recent normal colonoscopy. She is not on any  oral contraception. No other exacerbating or alleviating factors. Patient denies cough, shortness of breath, chest pain, fever, chills, abdominal pain, nausea, vomiting, diarrhea, congestion, sore throat, arm or leg trouble, eye pain, ear pain, rash, or other associated symptoms. Patient has HH of 6/21,denies any bleeding at this time,2 PRBC is ordered. HH is improved with PRBc,at DC time patient had  stable VS,patient was discharged home with recommendation for iron pills and OBGYN referral was done.     Goals of Care Treatment Preferences:         Consults:     No new Assessment & Plan notes have been filed under this hospital service since the last note was generated.  Service: Hospital Medicine    Final Active Diagnoses:    Diagnosis Date Noted POA    PRINCIPAL PROBLEM:  Acute blood loss anemia [D62] 04/30/2024 Yes    Menorrhagia [N92.0] 04/30/2024 Yes      Problems Resolved During this Admission:       Discharged Condition: stable    Disposition: Home or Self Care    Follow Up:   Follow-up Information       [facility] - Follow up.    Why: please call at time of discharge TODAY to schedule follow up and establish care for future medical needs  Contact information:  [address] [facility] [place] [address]  [phone]                           Patient Instructions:      Ambulatory referral/consult to Obstetrics / Gynecology   Standing Status: Future   Referral Priority: Routine Referral Type: Consultation   Referral Reason: Specialty Services Required   Requested Specialty: Obstetrics and Gynecology   Number of Visits Requested: 1     Activity as tolerated       Significant Diagnostic Studies: Labs: BMP:   Recent Labs   Lab 04/30/24  1037   *      K 3.3*      CO2 26   BUN 14   CREATININE 0.9   CALCIUM 10.3   , CMP   Recent Labs   Lab 04/30/24  1037      K 3.3*      CO2 26   *   BUN 14   CREATININE 0.9   CALCIUM 10.3   PROT 8.6*   ALBUMIN 3.8   BILITOT 0.9   ALKPHOS  96   AST 15   ALT 10   ANIONGAP 8   , and CBC   Recent Labs   Lab 04/30/24  1037 05/01/24  0707   WBC 8.10 8.49   HGB 6.1* 9.6*   HCT 21.5* 34.1*   * 514*     Radiology: X-Ray: CXR: X-Ray Chest 1 View (CXR):   Results for orders placed or performed during the hospital encounter of 04/30/24   X-Ray Chest 1 View    Narrative    EXAMINATION:  XR CHEST 1 VIEW    CLINICAL HISTORY:  chest pain;    TECHNIQUE:  Single frontal view of the chest was performed.    COMPARISON:  None    FINDINGS:  Cardiac silhouette is normal in size.  Lungs are symmetrically expanded.  No evidence of focal consolidative process, pneumothorax, or significant pleural effusion.  No acute osseous abnormality identified.      Impression    No acute cardiopulmonary process identified.      Electronically signed by: Zahira Aparicio MD  Date:    04/30/2024  Time:    20:55    and X-Ray Chest PA and Lateral (CXR): No results found for this visit on 04/30/24.    Pending Diagnostic Studies:       None           Medications:  Reconciled Home Medications:      Medication List        CONTINUE taking these medications      metFORMIN 1000 MG tablet  Commonly known as: GLUCOPHAGE  Take 1,000 mg by mouth 2 (two) times daily.              Indwelling Lines/Drains at time of discharge:   Lines/Drains/Airways       None                   Time spent on the discharge of patient: less than 30  minutes         Cami Slater MD  Department of Hospital Medicine  [facility] - Emergency Dept

## 2024-05-01 NOTE — PLAN OF CARE
Problem: Adult Inpatient Plan of Care  Goal: Plan of Care Review  Outcome: Progressing     Problem: Anemia  Goal: Anemia Symptom Improvement  Outcome: Progressing     Problem: Gastrointestinal Bleeding  Goal: Optimal Coping with Acute Illness  Outcome: Progressing

## 2024-05-01 NOTE — NURSING
Douglas RHODES,  Stated ok to restart blood after benadryl. Blood restarted at 100ml/hr. Pt educated to on sign/symptoms to notify nurse for. Pt and family verbalized understanding.

## 2024-05-01 NOTE — CARE UPDATE
Notify the began to experience diffuse itching and feeling as though her throat was itching after being her 2nd of blood.  The blood was stopped and she was treated with Benadryl.  She was then experienced chest tightness while attempting to ambulate to the restroom.    Her blood transfusion was stopped an EKG was checked which were without ST elevation her troponin was negative her chest x-ray had no acute process.  Transfusion reaction laboratory evaluation was sent as recommended by the blood bank.  We will hold further transfusion tonight and repeat hemoglobin in the morning to assess if another unit of blood is required. Her chest tightness resolved without intervention.     Douglas Plascencia PA-C  Department of Hospital Medicine   [facility]

## 2024-05-01 NOTE — NURSING
Called to room by pt. C/o itching all over and throat itching after starting 2nd unit of blood. Blood transfusion stopped. IV flushed. Douglas VENTURA notified. Order given for benadryl. Pt started vomiting unable to tolerated oral meds. /83  Sergei 100% on RA. Respirations even and unlabored. Pt on phone speaking in complete sentences. Demies SOB at present

## 2024-05-02 LAB
OHS QRS DURATION: 68 MS
OHS QTC CALCULATION: 439 MS

## 2024-05-20 LAB — SYMPTOMS P TRANSF RX PATIENT-IMP: NORMAL
